# Patient Record
Sex: MALE | Race: WHITE
[De-identification: names, ages, dates, MRNs, and addresses within clinical notes are randomized per-mention and may not be internally consistent; named-entity substitution may affect disease eponyms.]

---

## 2017-07-11 ENCOUNTER — HOSPITAL ENCOUNTER (OUTPATIENT)
Dept: HOSPITAL 45 - C.LAB | Age: 67
Discharge: HOME | End: 2017-07-11
Attending: INTERNAL MEDICINE
Payer: COMMERCIAL

## 2017-07-11 DIAGNOSIS — E78.5: ICD-10-CM

## 2017-07-11 DIAGNOSIS — I10: ICD-10-CM

## 2017-07-11 DIAGNOSIS — E11.9: Primary | ICD-10-CM

## 2017-07-11 LAB
ALBUMIN/GLOB SERPL: 1.2 {RATIO} (ref 0.9–2)
ALP SERPL-CCNC: 63 U/L (ref 45–117)
ALT SERPL-CCNC: 30 U/L (ref 12–78)
ANION GAP SERPL CALC-SCNC: 8 MMOL/L (ref 3–11)
AST SERPL-CCNC: 14 U/L (ref 15–37)
BUN SERPL-MCNC: 22 MG/DL (ref 7–18)
BUN/CREAT SERPL: 18.1 (ref 10–20)
CALCIUM SERPL-MCNC: 9.6 MG/DL (ref 8.5–10.1)
CHLORIDE SERPL-SCNC: 105 MMOL/L (ref 98–107)
CHOLEST/HDLC SERPL: 3.2 {RATIO}
CO2 SERPL-SCNC: 27 MMOL/L (ref 21–32)
CREAT SERPL-MCNC: 1.2 MG/DL (ref 0.6–1.4)
EST. AVERAGE GLUCOSE BLD GHB EST-MCNC: 111 MG/DL
GLOBULIN SER-MCNC: 3.2 GM/DL (ref 2.5–4)
GLUCOSE SERPL-MCNC: 110 MG/DL (ref 70–99)
GLUCOSE UR QL: 46 MG/DL
KETONES UR QL STRIP: 75 MG/DL
NITRITE UR QL STRIP: 138 MG/DL (ref 0–150)
PH UR: 149 MG/DL (ref 0–200)
POTASSIUM SERPL-SCNC: 4 MMOL/L (ref 3.5–5.1)
SODIUM SERPL-SCNC: 140 MMOL/L (ref 136–145)
TSH SERPL-ACNC: 0.7 UIU/ML (ref 0.3–4.5)
VERY LOW DENSITY LIPOPROT CALC: 28 MG/DL

## 2018-02-13 ENCOUNTER — HOSPITAL ENCOUNTER (OUTPATIENT)
Dept: HOSPITAL 45 - C.LAB | Age: 68
Discharge: HOME | End: 2018-02-13
Attending: INTERNAL MEDICINE
Payer: COMMERCIAL

## 2018-02-13 DIAGNOSIS — E11.9: ICD-10-CM

## 2018-02-13 DIAGNOSIS — M10.9: ICD-10-CM

## 2018-02-13 DIAGNOSIS — I10: ICD-10-CM

## 2018-02-13 DIAGNOSIS — Z00.00: Primary | ICD-10-CM

## 2018-02-13 LAB
BUN SERPL-MCNC: 24 MG/DL (ref 7–18)
CALCIUM SERPL-MCNC: 9.4 MG/DL (ref 8.5–10.1)
CO2 SERPL-SCNC: 28 MMOL/L (ref 21–32)
CREAT SERPL-MCNC: 1.24 MG/DL (ref 0.6–1.4)
GLUCOSE SERPL-MCNC: 141 MG/DL (ref 70–99)
POTASSIUM SERPL-SCNC: 3.8 MMOL/L (ref 3.5–5.1)
SODIUM SERPL-SCNC: 138 MMOL/L (ref 136–145)
URATE SERPL-MCNC: 7 MG/DL (ref 2.6–7.2)

## 2018-02-14 LAB — HBA1C MFR BLD: 5.3 % (ref 4.5–5.6)

## 2018-04-23 ENCOUNTER — HOSPITAL ENCOUNTER (OUTPATIENT)
Dept: HOSPITAL 45 - C.RAD | Age: 68
Discharge: HOME | End: 2018-04-23
Attending: INTERNAL MEDICINE
Payer: COMMERCIAL

## 2018-04-23 DIAGNOSIS — R05: Primary | ICD-10-CM

## 2018-04-23 NOTE — DIAGNOSTIC IMAGING REPORT
CHEST 2 VIEWS ROUTINE



CLINICAL HISTORY: R05 Cough dyspnea



COMPARISON STUDY:  No previous studies for comparison.



FINDINGS: The bones soft tissues and hemidiaphragms are normal. The

cardiomediastinal silhouette is normal. The lungs are clear. The pulmonary

vasculature is normal. 



IMPRESSION:  Negative chest. 











The above report was generated using voice recognition software.  It may contain

grammatical, syntax or spelling errors.









Electronically signed by:  Macario Whitney M.D.

4/23/2018 5:55 PM



Dictated Date/Time:  4/23/2018 5:55 PM

## 2018-07-25 ENCOUNTER — HOSPITAL ENCOUNTER (OUTPATIENT)
Dept: HOSPITAL 45 - C.LAB | Age: 68
Discharge: HOME | End: 2018-07-25
Attending: INTERNAL MEDICINE
Payer: COMMERCIAL

## 2018-07-25 DIAGNOSIS — E78.5: ICD-10-CM

## 2018-07-25 DIAGNOSIS — I10: ICD-10-CM

## 2018-07-25 DIAGNOSIS — E11.9: Primary | ICD-10-CM

## 2018-07-25 LAB
ALBUMIN SERPL-MCNC: 4.1 GM/DL (ref 3.4–5)
ALP SERPL-CCNC: 74 U/L (ref 45–117)
ALT SERPL-CCNC: 30 U/L (ref 12–78)
AST SERPL-CCNC: 19 U/L (ref 15–37)
BUN SERPL-MCNC: 25 MG/DL (ref 7–18)
CALCIUM SERPL-MCNC: 9.3 MG/DL (ref 8.5–10.1)
CO2 SERPL-SCNC: 28 MMOL/L (ref 21–32)
CREAT SERPL-MCNC: 1.53 MG/DL (ref 0.6–1.4)
GLUCOSE SERPL-MCNC: 149 MG/DL (ref 70–99)
KETONES UR QL STRIP: 51 MG/DL
PH UR: 137 MG/DL (ref 0–200)
POTASSIUM SERPL-SCNC: 3.9 MMOL/L (ref 3.5–5.1)
PROT SERPL-MCNC: 7.3 GM/DL (ref 6.4–8.2)
SODIUM SERPL-SCNC: 140 MMOL/L (ref 136–145)

## 2018-07-26 LAB — HBA1C MFR BLD: 5.6 % (ref 4.5–5.6)

## 2018-08-21 ENCOUNTER — HOSPITAL ENCOUNTER (OUTPATIENT)
Dept: HOSPITAL 45 - C.LAB | Age: 68
Discharge: HOME | End: 2018-08-21
Attending: INTERNAL MEDICINE
Payer: COMMERCIAL

## 2018-08-21 DIAGNOSIS — I10: ICD-10-CM

## 2018-08-21 DIAGNOSIS — E11.9: Primary | ICD-10-CM

## 2018-08-21 LAB
BUN SERPL-MCNC: 20 MG/DL (ref 7–18)
CALCIUM SERPL-MCNC: 9 MG/DL (ref 8.5–10.1)
CO2 SERPL-SCNC: 28 MMOL/L (ref 21–32)
CREAT SERPL-MCNC: 1.18 MG/DL (ref 0.6–1.4)
GLUCOSE SERPL-MCNC: 120 MG/DL (ref 70–99)
POTASSIUM SERPL-SCNC: 4.1 MMOL/L (ref 3.5–5.1)
SODIUM SERPL-SCNC: 139 MMOL/L (ref 136–145)